# Patient Record
Sex: MALE | Race: WHITE | HISPANIC OR LATINO | Employment: OTHER | URBAN - METROPOLITAN AREA
[De-identification: names, ages, dates, MRNs, and addresses within clinical notes are randomized per-mention and may not be internally consistent; named-entity substitution may affect disease eponyms.]

---

## 2017-04-27 ENCOUNTER — OFFICE VISIT (OUTPATIENT)
Dept: UROLOGY | Facility: CLINIC | Age: 72
End: 2017-04-27
Payer: MEDICARE

## 2017-04-27 VITALS
HEART RATE: 69 BPM | HEIGHT: 67 IN | SYSTOLIC BLOOD PRESSURE: 149 MMHG | BODY MASS INDEX: 33.78 KG/M2 | WEIGHT: 215.19 LBS | DIASTOLIC BLOOD PRESSURE: 86 MMHG

## 2017-04-27 DIAGNOSIS — E29.1 HYPOGONADISM MALE: Primary | ICD-10-CM

## 2017-04-27 PROCEDURE — 99999 PR PBB SHADOW E&M-EST. PATIENT-LVL III: CPT | Mod: PBBFAC,,, | Performed by: UROLOGY

## 2017-04-27 PROCEDURE — 99213 OFFICE O/P EST LOW 20 MIN: CPT | Mod: PBBFAC | Performed by: UROLOGY

## 2017-04-27 PROCEDURE — 99204 OFFICE O/P NEW MOD 45 MIN: CPT | Mod: S$PBB,,, | Performed by: UROLOGY

## 2017-04-27 RX ORDER — CLARITHROMYCIN 500 MG/1
TABLET, FILM COATED ORAL
Refills: 0 | COMMUNITY
Start: 2017-03-09

## 2017-04-27 NOTE — PROGRESS NOTES
Subjective:       Patient ID: Elliott Tucker is a 71 y.o. male.    Chief Complaint: Advice Only (c/ prostate issue , nocturia 2 to 3 times, he voided more during the day time.)    HPI  very nice gentleman who is here for second opinion.  He was seen Dr. Berry has 100 g prostate and symptoms of outlet obstruction.  He takes 5 alpha reductase inhibitor and an alpha-blocker and is satisfied with his voiding.  He feels like he empties his bladder.  Dr. Berry had him scheduled for urodynamics however it was never done and he seems to be doing well so he wishes to be followed.  His other main issue is erectile dysfunction and low testosterone.  I saw one testosterone level was in the mid 500 range and another one that was around 90.  Patient finally explained to me that he was taking IM testosterone from Dr. Oakes.  He may be interested in Testopel.  But I would like to get another testosterone level on him.  His wife is younger he is still interested in having sexual activity    Past Medical History:   Diagnosis Date    Hyperlipidemia     Hypertension        History reviewed. No pertinent surgical history.    Family History   Problem Relation Age of Onset    Hyperlipidemia Other        Social History     Social History    Marital status:      Spouse name: N/A    Number of children: N/A    Years of education: N/A     Occupational History    Not on file.     Social History Main Topics    Smoking status: Never Smoker    Smokeless tobacco: Not on file    Alcohol use 0.0 oz/week     0 Standard drinks or equivalent per week      Comment: whiskey/ 2x weekly    Drug use: Not on file    Sexual activity: Not on file     Other Topics Concern    Not on file     Social History Narrative       Allergies:  Review of patient's allergies indicates no known allergies.    Medications:    Current Outpatient Prescriptions:     dutasteride (AVODART) 0.5 mg capsule, Take 0.5 mg by mouth once daily., Disp: , Rfl:      FOLIC ACID/MULTIVIT-MIN/LUTEIN (CENTRUM SILVER ORAL), Take by mouth., Disp: , Rfl:     mometasone (NASONEX) 50 mcg/actuation nasal spray, 2 sprays by Nasal route once daily., Disp: 17 g, Rfl: 6    nystatin-triamcinolone (MYCOLOG) ointment, Apply topically 2 (two) times daily., Disp: 15 g, Rfl: 1    testosterone cypionate (DEPOTESTOTERONE CYPIONATE) 200 mg/mL injection, Inject 200 mg into the muscle every 14 (fourteen) days., Disp: , Rfl:     valsartan (DIOVAN) 320 MG tablet, Take 320 mg by mouth once daily., Disp: , Rfl:     valsartan-hydrochlorothiazide (DIOVAN-HCT) 320-25 mg per tablet, TK 1 T PO D, Disp: , Rfl: 1    clarithromycin (BIAXIN) 500 MG tablet, TK 1 T PO BID FOR 1 WEEK, Disp: , Rfl: 0    metoprolol succinate (TOPROL-XL) 100 MG 24 hr tablet, Take 100 mg by mouth once daily.  , Disp: , Rfl:     silodosin 8 mg Cap capsule, Take 8 mg by mouth once daily., Disp: , Rfl:     Review of Systems   Constitutional: Negative for activity change, appetite change, chills, diaphoresis, fatigue, fever and unexpected weight change.   HENT: Negative for congestion, dental problem, hearing loss, mouth sores, postnasal drip, rhinorrhea, sinus pressure and trouble swallowing.    Eyes: Negative for pain, discharge and itching.   Respiratory: Negative for apnea, cough, choking, chest tightness, shortness of breath and wheezing.    Cardiovascular: Negative for chest pain, palpitations and leg swelling.   Gastrointestinal: Negative for abdominal distention, abdominal pain, anal bleeding, blood in stool, constipation, diarrhea, nausea, rectal pain and vomiting.   Endocrine: Negative for polydipsia and polyuria.   Genitourinary: Positive for decreased urine volume. Negative for difficulty urinating, discharge, dysuria, enuresis, flank pain, frequency, genital sores, hematuria, penile pain, penile swelling, scrotal swelling, testicular pain and urgency.   Musculoskeletal: Negative for arthralgias, back pain and myalgias.    Skin: Negative for color change, rash and wound.   Neurological: Negative for dizziness, syncope, speech difficulty, light-headedness and headaches.   Hematological: Negative for adenopathy. Does not bruise/bleed easily.   Psychiatric/Behavioral: Negative for behavioral problems, confusion, hallucinations and sleep disturbance.       Objective:      Physical Exam   Constitutional: He appears well-developed.   HENT:   Head: Normocephalic.   Cardiovascular: Normal rate.    Pulmonary/Chest: Effort normal.   Abdominal: Soft.   Neurological: He is alert.   Skin: Skin is warm.     Psychiatric: He has a normal mood and affect.       Assessment:       1. Hypogonadism male        Plan:       Elliott was seen today for advice only.    Diagnoses and all orders for this visit:    Hypogonadism male  -     Testosterone; Future  -     Ambulatory consult to Urology     patient is BPH with 100 g prostate.  He does not need to have surgery at this time since he feels like he is doing well but I did discuss possibility of him taking testosterone and needing future surgery.  If he needs a sugar I could offer him a TURP versus retropubic prostatectomy but he is concerned that this may affect his sexual ability.  At the present time he is voiding well so I'll simply follow him for this.  The graft all arrange an appointment with Dr. Jimmy Sarmiento for possible Testopel depending on the repeat PSA.  He's been off testosterone for about a year.  I will see him back in 6 months and follow his BPH symptoms

## 2017-04-27 NOTE — LETTER
April 27, 2017      KELVIN Hutchins MD  [address]  [facility] [address]           [facility] - Urology 4th Floor  [address]  [facility] [address]  Phone: [phone]          Patient: Elliott Tucker   MR Number: [id number]   YOB: 1945   Date of Visit: 4/27/2017       Dear Dr. KELVIN Hutchins:    Thank you for referring Elliott Tucker to me for evaluation. Attached you will find relevant portions of my assessment and plan of care.    If you have questions, please do not hesitate to call me. I look forward to following Elliott Tucker along with you.    Sincerely,    Andrew Ceron Jr., MD    Enclosure  CC:  No Recipients    If you would like to receive this communication electronically, please contact [email or web address] or [phone] to request more information on [employer] Link access.    For providers and/or their staff who would like to refer a patient to [facility], please contact us through our one-stop-shop provider referral line, [facility] , at [phone].    If you feel you have received this communication in error or would no longer like to receive these types of communications, please e-mail [email or web address]

## 2017-04-27 NOTE — MR AVS SNAPSHOT
[facility] - Urology 4th Floor  [address] [facility] [place] [address]  Phone: [phone]                  Elliott Tucker   2017 10:15 AM   Office Visit    Descripción:  Male : 1945   Personal Médico:  Andrew Ceron Jr., MD   Departamento:  [facility] - Urology 4th Floor           Razón de la zaria     Advice Only           Diagnósticos de Esta Visita        Comentarios    Hypogonadism male    -  Primario            Lista de tareas           Citas próximas        Personal Médico Departamento Tfno del dpto    2017 8:20 AM LAB, APPOINTMENT [place] [facility] [phone]      Metas (5 Years of Data)     Ninguna      [facility] en [facility]     [facility] En [facility] Línea de Enfermeras - Asistencia   Enfermeras registradas de [facility] pueden ayudarle a reservar zoran zaria, proveer educación para la jose, asesoría clínica, y otros servicios de asesoramiento.   Llame para haleigh servicio gratuito a [phone].             Medicamentos           Mensaje sobre Medicamentos     Verificar los cambios y / o adiciones a mei régimen de medicación son los mismos que discutir con mei médico. Si cualquiera de estos cambios o adiciones son incorrectos, por favor notifique a mei proveedor de atención médica.        DEJAR de stacia estos medicamentos     hydrocodone-acetaminophen 5-325mg (NORCO) 5-325 mg per tablet Take 1-2 tablets by mouth every 6 (six) hours as needed for Pain.    lorazepam (ATIVAN) 2 MG Tab Take 0.5 mg by mouth every 6 (six) hours as needed.           Verifique que la siguiente lista de medicamentos es zoran representación exacta de los medicamentos que está tomando actualmente. Si no hay ningunos reportados, la lista puede estar en pearson. Si no es correcta, por favor póngase en contacto con mei proveedor de atención médica. Lleve esta lista con usted en winston de emergencia.           Medicamentos Actuales     dutasteride (AVODART) 0.5 mg capsule Take 0.5 mg by mouth once  "daily.    FOLIC ACID/MULTIVIT-MIN/LUTEIN (CENTRUM SILVER ORAL) Take by mouth.    mometasone (NASONEX) 50 mcg/actuation nasal spray 2 sprays by Nasal route once daily.    nystatin-triamcinolone (MYCOLOG) ointment Apply topically 2 (two) times daily.    testosterone cypionate (DEPOTESTOTERONE CYPIONATE) 200 mg/mL injection Inject 200 mg into the muscle every 14 (fourteen) days.    valsartan (DIOVAN) 320 MG tablet Take 320 mg by mouth once daily.    valsartan-hydrochlorothiazide (DIOVAN-HCT) 320-25 mg per tablet TK 1 T PO D    clarithromycin (BIAXIN) 500 MG tablet TK 1 T PO BID FOR 1 WEEK    metoprolol succinate (TOPROL-XL) 100 MG 24 hr tablet Take 100 mg by mouth once daily.      silodosin 8 mg Cap capsule Take 8 mg by mouth once daily.           Información de referencia clínica           Judith signos vitales jennifer     PS [place] BMI ([facility])       149/86 69 5' 7" (1.702 m) 97.6 kg (215 lb 2.7 oz) 33.7 kg/m2       Blood Pressure          Most Recent Value    BP  (!)  149/86      Alergias     A partir del:  4/27/2017        No Known Allergies      Vacunas     Administradas en la fecha de la visita:  4/27/2017        None      Orders Placed During Today's Visit      Órdenes normales de esta visita    Ambulatory consult to Urology     Exámenes/Procedimientos futuros Se espera el Vence    Testosterone  4/27/2017 6/26/2018      Language Assistance Services     ATTENTION: Language assistance services are available, free of charge. Please call [phone].      ATENCIÓN: Si habla español, tiene a mei disposición servicios gratuitos de asistencia lingüística. Llame al [phone].     [facility] Ý: N?u b?n nói Ti?ng Vi?t, có các d?ch v? h? tr? ngôn ng? mi?n phí dành cho b?n. G?i s? [phone].         [facility] - Urology 4th Floor cumple con las leyes federales aplicables de derechos civiles y no discrimina por motivos de laura, color, origen nacional, edad, discapacidad, o sexo.                 Elliott Tucker "   2017 10:15 AM   Office Visit    Description:  Male : 1945   Provider:  Andrew Ceron Jr., MD   Department:  [facility] 4th Floor           Reason for Visit     Advice Only           Diagnoses this Visit        Comments    Hypogonadism male    -  Primary            To Do List           Future Appointments        Provider Department Dept Phone    2017 8:20 AM LAB, APPOINTMENT [place] [facility] [phone]      Goals     None      [facility] On Call     [facility] Nurse Care Line -  Assistance  Unless otherwise directed by your provider, please contact [facility], our nurse care line that is available for  assistance.     Registered nurses in the [facility] Center provide: appointment scheduling, clinical advisement, health education, and other advisory services.  Call: [phone] (toll free)               Medications           Message regarding Medications     Verify the changes and/or additions to your medication regime listed below are the same as discussed with your clinician today.  If any of these changes or additions are incorrect, please notify your healthcare provider.        STOP taking these medications     hydrocodone-acetaminophen 5-325mg (NORCO) 5-325 mg per tablet Take 1-2 tablets by mouth every 6 (six) hours as needed for Pain.    lorazepam (ATIVAN) 2 MG Tab Take 0.5 mg by mouth every 6 (six) hours as needed.           Verify that the below list of medications is an accurate representation of the medications you are currently taking.  If none reported, the list may be blank. If incorrect, please contact your healthcare provider. Carry this list with you in case of emergency.           Current Medications     dutasteride (AVODART) 0.5 mg capsule Take 0.5 mg by mouth once daily.    FOLIC ACID/MULTIVIT-MIN/LUTEIN (CENTRUM SILVER ORAL) Take by mouth.    mometasone (NASONEX) 50 mcg/actuation nasal spray 2 sprays by Nasal route  "once daily.    nystatin-triamcinolone (MYCOLOG) ointment Apply topically 2 (two) times daily.    testosterone cypionate (DEPOTESTOTERONE CYPIONATE) 200 mg/mL injection Inject 200 mg into the muscle every 14 (fourteen) days.    valsartan (DIOVAN) 320 MG tablet Take 320 mg by mouth once daily.    valsartan-hydrochlorothiazide (DIOVAN-HCT) 320-25 mg per tablet TK 1 T PO D    clarithromycin (BIAXIN) 500 MG tablet TK 1 T PO BID FOR 1 WEEK    metoprolol succinate (TOPROL-XL) 100 MG 24 hr tablet Take 100 mg by mouth once daily.      silodosin 8 mg Cap capsule Take 8 mg by mouth once daily.           Clinical Reference Information           Your Vitals Were     BP Pulse Height Weight BMI       149/86 69 5' 7" (1.702 m) 97.6 kg (215 lb 2.7 oz) 33.7 kg/m2       Blood Pressure          Most Recent Value    BP  (!)  149/86      Allergies as of 4/27/2017     No Known Allergies      Immunizations Administered on Date of Encounter - 4/27/2017     None      Orders Placed During Today's Visit      Normal Orders This Visit    Ambulatory consult to Urology     Future Labs/Procedures Expected by Expires    Testosterone  4/27/2017 6/26/2018      Language Assistance Services     ATTENTION: Language assistance services are available, free of charge. Please call [phone].      ATENCIÓN: Si marah hawkins, tiene a mei disposición servicios gratuitos de asistencia lingüística. Llame al [phone].     TONI Ý: N?u b?n nói Ti?ng Vi?t, có các d?ch v? h? tr? ngôn ng? mi?n phí dành cho b?n. G?i s? [phone].         [facility] - Urology 4th Floor complies with applicable Federal civil rights laws and does not discriminate on the basis of race, color, national origin, age, disability, or sex.          "

## 2017-04-28 ENCOUNTER — LAB VISIT (OUTPATIENT)
Dept: LAB | Facility: HOSPITAL | Age: 72
End: 2017-04-28
Attending: UROLOGY
Payer: MEDICARE

## 2017-04-28 DIAGNOSIS — E29.1 HYPOGONADISM MALE: ICD-10-CM

## 2017-04-28 DIAGNOSIS — E29.1 HYPOGONADISM MALE: Primary | ICD-10-CM

## 2017-04-28 LAB — TESTOST SERPL-MCNC: 265 NG/DL

## 2017-04-28 PROCEDURE — 36415 COLL VENOUS BLD VENIPUNCTURE: CPT

## 2017-04-28 PROCEDURE — 84403 ASSAY OF TOTAL TESTOSTERONE: CPT

## 2017-04-29 ENCOUNTER — PATIENT MESSAGE (OUTPATIENT)
Dept: UROLOGY | Facility: CLINIC | Age: 72
End: 2017-04-29

## 2017-05-05 RX ORDER — DUTASTERIDE 0.5 MG/1
0.5 CAPSULE, LIQUID FILLED ORAL DAILY
Qty: 90 CAPSULE | Refills: 3 | Status: SHIPPED | OUTPATIENT
Start: 2017-05-05

## 2017-06-14 ENCOUNTER — OFFICE VISIT (OUTPATIENT)
Dept: UROLOGY | Facility: CLINIC | Age: 72
End: 2017-06-14
Payer: MEDICARE

## 2017-06-14 VITALS
WEIGHT: 226.63 LBS | HEIGHT: 67 IN | SYSTOLIC BLOOD PRESSURE: 132 MMHG | HEART RATE: 82 BPM | DIASTOLIC BLOOD PRESSURE: 80 MMHG | BODY MASS INDEX: 35.57 KG/M2

## 2017-06-14 DIAGNOSIS — N13.8 BPH WITH URINARY OBSTRUCTION: ICD-10-CM

## 2017-06-14 DIAGNOSIS — I10 ESSENTIAL HYPERTENSION: ICD-10-CM

## 2017-06-14 DIAGNOSIS — N52.01 ERECTILE DYSFUNCTION DUE TO ARTERIAL INSUFFICIENCY: Primary | ICD-10-CM

## 2017-06-14 DIAGNOSIS — E78.5 HYPERLIPIDEMIA, UNSPECIFIED HYPERLIPIDEMIA TYPE: ICD-10-CM

## 2017-06-14 DIAGNOSIS — N40.1 BPH WITH URINARY OBSTRUCTION: ICD-10-CM

## 2017-06-14 PROCEDURE — 1159F MED LIST DOCD IN RCRD: CPT | Mod: ,,, | Performed by: UROLOGY

## 2017-06-14 PROCEDURE — 99999 PR PBB SHADOW E&M-EST. PATIENT-LVL III: CPT | Mod: PBBFAC,,, | Performed by: UROLOGY

## 2017-06-14 PROCEDURE — 99213 OFFICE O/P EST LOW 20 MIN: CPT | Mod: PBBFAC | Performed by: UROLOGY

## 2017-06-14 PROCEDURE — 99214 OFFICE O/P EST MOD 30 MIN: CPT | Mod: S$PBB,,, | Performed by: UROLOGY

## 2017-06-14 RX ORDER — TADALAFIL 20 MG/1
20 TABLET ORAL DAILY PRN
Qty: 10 TABLET | Refills: 11 | Status: SHIPPED | OUTPATIENT
Start: 2017-06-14 | End: 2023-08-27 | Stop reason: SDUPTHER

## 2017-06-14 RX ORDER — TADALAFIL 20 MG/1
20 TABLET ORAL DAILY PRN
Qty: 10 TABLET | Refills: 11 | Status: SHIPPED | OUTPATIENT
Start: 2017-06-14 | End: 2017-06-14

## 2017-06-14 NOTE — PROGRESS NOTES
CHIEF COMPLAINT:    Mr. Tucker is a 71 y.o. male presenting for a consultation at the request of Dr. Ceron. Patient presents with low T.    PRESENTING ILLNESS:    Elliott Tucker is a 71 y.o. male who c/o ED.  He's tried and failed Viagra due to congestion.  He gets good results with Cialis.  This has been present for > 1 year.    He reports a history of low T.  He's been on TRT via injections in the past.  However, he also reports a history of an elevated prolactin.  He's had a T here x 1 that was low.    He's on Avodart and rapaflo for his LUTS managed by Dr. Ceron.  REVIEW OF SYSTEMS:    Elliott Tucker denies any history of headache, blurred vision, fever, nausea, vomiting, chills, abdominal pain, bleeding per rectum, cough, SOB, recent loss of consciousness, recent mental status changes, seizures, dizziness, or upper or lower extremity weakness.    CHAVEZ  1. 1  2. 2  3. 3  4. 2  5. 1      PATIENT HISTORY:    Past Medical History:   Diagnosis Date    Hyperlipidemia     Hypertension        History reviewed. No pertinent surgical history.    Family History   Problem Relation Age of Onset    Hyperlipidemia Other        Social History     Social History    Marital status:      Spouse name: N/A    Number of children: N/A    Years of education: N/A     Occupational History    Not on file.     Social History Main Topics    Smoking status: Never Smoker    Smokeless tobacco: Not on file    Alcohol use 0.0 oz/week      Comment: whiskey/ 2x weekly    Drug use: Unknown    Sexual activity: Not on file     Other Topics Concern    Not on file     Social History Narrative    No narrative on file       Allergies:  Review of patient's allergies indicates no known allergies.    Medications:    Current Outpatient Prescriptions:     dutasteride (AVODART) 0.5 mg capsule, Take 1 capsule (0.5 mg total) by mouth once daily., Disp: 90 capsule, Rfl: 3    FOLIC ACID/MULTIVIT-MIN/LUTEIN (CENTRUM SILVER ORAL), Take by  mouth., Disp: , Rfl:     metoprolol succinate (TOPROL-XL) 100 MG 24 hr tablet, Take 100 mg by mouth once daily.  , Disp: , Rfl:     mometasone (NASONEX) 50 mcg/actuation nasal spray, 2 sprays by Nasal route once daily., Disp: 17 g, Rfl: 6    nystatin-triamcinolone (MYCOLOG) ointment, Apply topically 2 (two) times daily., Disp: 15 g, Rfl: 1    valsartan-hydrochlorothiazide (DIOVAN-HCT) 320-25 mg per tablet, TK 1 T PO D, Disp: , Rfl: 1    clarithromycin (BIAXIN) 500 MG tablet, TK 1 T PO BID FOR 1 WEEK, Disp: , Rfl: 0    silodosin 8 mg Cap capsule, Take 8 mg by mouth once daily., Disp: , Rfl:     testosterone cypionate (DEPOTESTOTERONE CYPIONATE) 200 mg/mL injection, Inject 200 mg into the muscle every 14 (fourteen) days., Disp: , Rfl:     valsartan (DIOVAN) 320 MG tablet, Take 320 mg by mouth once daily., Disp: , Rfl:     PHYSICAL EXAMINATION:    The patient generally appears in good health, is appropriately interactive, and is in no apparent distress.     Eyes: anicteric sclerae, moist conjunctivae; no lid-lag; PERRLA     HENT: Atraumatic; oropharynx clear with moist mucous membranes and no mucosal ulcerations;normal hard and soft palate.  No evidence of lymphadenopathy.    Neck: Trachea midline.  No thyromegaly.    Musculoskeletal: No abnormal gait.    Skin: No lesions.    Mental: Cooperative with normal affect.  Is oriented to time, place, and person.    Neuro: Grossly intact.    Chest: Normal inspiratory effort.   No accessory muscles.  No audible wheezes.  Respirations symmetric on inspiration and expiration.    Heart: Regular rhythm.      Abdomen:  Soft, non-tender. No masses or organomegaly. Bladder is not palpable. No evidence of flank discomfort. No evidence of inguinal hernia.    Genitourinary: The penis is not circumcised with no evidence of plaques or induration. The urethral meatus is normal. The testes, epididymides, and cord structures are normal in size and contour bilaterally. The scrotum is  normal in size and contour.    Normal anal sphincter tone. No rectal mass.    The prostate is smooth. Normal landmarks. Lateral sulci. Median furrow intact.  No nodularity or induration. Seminal vesicles are normal.    Extremities: No clubbing, cyanosis, or edema      LABS:      No results found for: PSA, PSADIAG, PSATOTAL, PSAFREE, PSAFREEPCT    IMPRESSION:    Encounter Diagnoses   Name Primary?    Erectile dysfunction due to arterial insufficiency Yes    BPH with urinary obstruction     Essential hypertension     Hyperlipidemia, unspecified hyperlipidemia type    HTN,controlled  Hyperlipidemia, controlled    PLAN:    1. Will repeat a T with a prolactin in the AM.  2. Will try Cialis for the ED. Side effects discussed.  A new Rx was given  3. Continue Avodart and rapaflo for his LUTS.  Can continue to see Dr. Ceron for these.    Copy to:

## 2017-06-14 NOTE — PATIENT INSTRUCTIONS
Oral Medications for Erectile Dysfunction  Prescription oral medications can be used for ED. They often work well. But, like all medications, they can have side effects. Also, they cant be used if a man has certain health problems or takes certain other medications. Talk with your doctor about oral ED medication. Ask whether it is right for you.  Types of Oral ED Medications  There are three types of prescription oral ED medications. Each one increases blood flow to the penis. When the penis is stimulated, an erection results. The three types are:  · Sildenafil citrate (Viagra)  · Tadalafil (Cialis)  · Vardenafil HCl (Levitra)  What Oral ED Medications Dont Do  There are some things ED medications cant do.  · They dont cure the cause of your ED. Without the medication, youll still have trouble getting an erection.  · They cant produce an erection without sexual stimulation.  · They wont increase sexual desire. They also wont solve any other sexual issues. Psychological, emotional, or relationship issues will not be fixed.  Taking Oral ED Medications Safely  · Do not combine ED medications with other treatments unless your doctor tells you to.  · Dont take ED medications more often or in larger doses than prescribed.  · Tell your doctor your health history. Mention all medications you take. This includes over-the-counter drugs, supplements, and herbs.  · Ask your doctor about whether you can drink alcohol while taking ED medication.  Possible Side Effects of Oral ED Medications  · Headache  · Facial flushing  · Runny or stuffy nose  · Indigestion  · Distortion of your color vision for a short time  · Sudden vision loss or hearing loss (rare)  Risks of Oral ED Medications   · Do not take ED medications if you take medications containing nitrates. The combination may drop your blood pressure to a dangerous level. Nitrates include nitroglycerin (a drug for angina). They are also in poppers, an inhaled  recreational drug. If youre not sure whether youre taking nitrates, ask your doctor or pharmacist.  · Medications called alpha-blockers can interact with ED medications. They can cause a sudden drop in blood pressure. Alpha-blockers are a common treatment for prostate problems. They also treat high blood pressure. Be sure your doctor knows if you take these medications.  · If youve had a heart attack or have heart disease and you have not had sex for a while, talk to your doctor. Having sex again can put extra strain on your heart. Your doctor can confirm that your heart is healthy enough for sex.  · It is rare, but some men taking ED medications have had sudden vision loss. This may be more likely if other health problems are present. These include high blood pressure and diabetes. Ask your doctor if you are at risk for this type of vision loss.  · In rare cases, an erection may last too long. This can badly damage the blood vessels in your penis. If an erection lasts longer than 4 hours, go to the emergency room right away.       © 1679-2156 [employer], [address] [place] [address], [place] [address]. All rights reserved. This information is not intended as a substitute for professional medical care. Always follow your healthcare professional's instructions.

## 2017-06-14 NOTE — LETTER
June 14, 2017      Andrew Ceron Jr., MD  [address] [facility] [address]           [facility] - Urology 4th Floor  [address]  [facility] [address]  Phone: [phone]          Patient: Elliott Tucker   MR Number: [id number]   YOB: 1945   Date of Visit: 6/14/2017       Dear Dr. Andrew Ceron Jr.:    Thank you for referring Elliott Tucker to me for evaluation. Attached you will find relevant portions of my assessment and plan of care.    If you have questions, please do not hesitate to call me. I look forward to following Elliott Tucker along with you.    Sincerely,    Jimmy Sarmiento MD    Enclosure  CC:  No Recipients    If you would like to receive this communication electronically, please contact [email or web address] or [phone] to request more information on [employer] Link access.    For providers and/or their staff who would like to refer a patient to [facility], please contact us through our one-stop-shop provider referral line, [facility], at [phone].    If you feel you have received this communication in error or would no longer like to receive these types of communications, please e-mail [email or web address]

## 2017-06-15 ENCOUNTER — LAB VISIT (OUTPATIENT)
Dept: LAB | Facility: HOSPITAL | Age: 72
End: 2017-06-15
Attending: UROLOGY
Payer: MEDICARE

## 2017-06-15 DIAGNOSIS — N52.01 ERECTILE DYSFUNCTION DUE TO ARTERIAL INSUFFICIENCY: ICD-10-CM

## 2017-06-15 LAB
PROLACTIN SERPL IA-MCNC: 20.5 NG/ML
TESTOST SERPL-MCNC: 334 NG/DL

## 2017-06-15 PROCEDURE — 36415 COLL VENOUS BLD VENIPUNCTURE: CPT

## 2017-06-15 PROCEDURE — 84146 ASSAY OF PROLACTIN: CPT

## 2017-06-15 PROCEDURE — 84403 ASSAY OF TOTAL TESTOSTERONE: CPT

## 2017-06-19 ENCOUNTER — TELEPHONE (OUTPATIENT)
Dept: UROLOGY | Facility: CLINIC | Age: 72
End: 2017-06-19

## 2017-06-19 NOTE — TELEPHONE ENCOUNTER
Please notify that his T is normal, but his prolactin is high.  He should see endocrine regarding this.    He can continue to see Dr. Ceron.  RTC to see me prn.

## 2018-04-19 ENCOUNTER — HOSPITAL ENCOUNTER (OUTPATIENT)
Dept: RADIOLOGY | Facility: HOSPITAL | Age: 73
Discharge: HOME | End: 2018-04-19
Attending: ORTHOPAEDIC SURGERY
Payer: MEDICARE

## 2018-04-19 DIAGNOSIS — M25.561 RIGHT KNEE PAIN: Primary | ICD-10-CM

## 2018-04-19 DIAGNOSIS — M25.561 RIGHT KNEE PAIN: ICD-10-CM

## 2018-04-19 PROCEDURE — 73564 X-RAY EXAM KNEE 4 OR MORE: CPT | Mod: 26,RT,, | Performed by: RADIOLOGY

## 2018-04-19 PROCEDURE — 73564 X-RAY EXAM KNEE 4 OR MORE: CPT | Mod: TC,FY,RT

## 2021-11-01 ENCOUNTER — IMMUNIZATION (OUTPATIENT)
Dept: PRIMARY CARE CLINIC | Facility: CLINIC | Age: 76
End: 2021-11-01
Payer: MEDICARE

## 2021-11-01 DIAGNOSIS — Z23 NEED FOR VACCINATION: Primary | ICD-10-CM

## 2021-11-01 PROCEDURE — 91300 COVID-19, MRNA, LNP-S, PF, 30 MCG/0.3 ML DOSE VACCINE: CPT | Mod: PBBFAC | Performed by: INTERNAL MEDICINE

## 2022-06-06 ENCOUNTER — OFFICE VISIT (OUTPATIENT)
Dept: URGENT CARE | Facility: CLINIC | Age: 77
End: 2022-06-06
Payer: MEDICARE

## 2022-06-06 VITALS
WEIGHT: 226 LBS | HEART RATE: 78 BPM | HEIGHT: 67 IN | RESPIRATION RATE: 20 BRPM | BODY MASS INDEX: 35.47 KG/M2 | DIASTOLIC BLOOD PRESSURE: 88 MMHG | TEMPERATURE: 97 F | SYSTOLIC BLOOD PRESSURE: 140 MMHG | OXYGEN SATURATION: 97 %

## 2022-06-06 DIAGNOSIS — R05.9 COUGH: Primary | ICD-10-CM

## 2022-06-06 LAB
CTP QC/QA: YES
SARS-COV-2 RDRP RESP QL NAA+PROBE: NEGATIVE

## 2022-06-06 PROCEDURE — 99214 PR OFFICE/OUTPT VISIT, EST, LEVL IV, 30-39 MIN: ICD-10-PCS | Mod: S$GLB,,, | Performed by: FAMILY MEDICINE

## 2022-06-06 PROCEDURE — U0002: ICD-10-PCS | Mod: QW,CR,S$GLB, | Performed by: FAMILY MEDICINE

## 2022-06-06 PROCEDURE — 99214 OFFICE O/P EST MOD 30 MIN: CPT | Mod: S$GLB,,, | Performed by: FAMILY MEDICINE

## 2022-06-06 PROCEDURE — U0002 COVID-19 LAB TEST NON-CDC: HCPCS | Mod: QW,CR,S$GLB, | Performed by: FAMILY MEDICINE

## 2022-06-06 RX ORDER — BENZONATATE 100 MG/1
100 CAPSULE ORAL EVERY 6 HOURS PRN
Qty: 30 CAPSULE | Refills: 1 | Status: SHIPPED | OUTPATIENT
Start: 2022-06-06 | End: 2023-06-06

## 2022-06-06 RX ORDER — PROMETHAZINE HYDROCHLORIDE AND DEXTROMETHORPHAN HYDROBROMIDE 6.25; 15 MG/5ML; MG/5ML
5 SYRUP ORAL NIGHTLY PRN
Qty: 118 ML | Refills: 0 | Status: SHIPPED | OUTPATIENT
Start: 2022-06-06 | End: 2022-06-16

## 2022-06-06 NOTE — PROGRESS NOTES
Subjective:       Patient ID: Elliott Tucker is a 76 y.o. male.    Chief Complaint: No chief complaint on file.    HPI  ROS     Objective:      Physical Exam    Assessment:       No diagnosis found.    Plan:                   No follow-ups on file.

## 2022-06-06 NOTE — PROGRESS NOTES
"Subjective:       Patient ID: Elliott Tucker is a 76 y.o. male.    Vitals:  height is 5' 7" (1.702 m) and weight is 102.5 kg (226 lb). His temperature is 97 °F (36.1 °C). His blood pressure is 140/88 (abnormal) and his pulse is 78. His respiration is 20 and oxygen saturation is 97%.     Chief Complaint: Cough    Pt states for 15 days having a dry cough, he denies any other sx ,he is vaccinated. Reports positive home COVID-19 test 2 weeks ago but has had persistent cough. Other symptoms have since resolved.    Cough  This is a new problem. The current episode started 1 to 4 weeks ago. The problem has been gradually worsening. The problem occurs constantly. The cough is non-productive.       Respiratory: Positive for cough.        Objective:      Physical Exam   Constitutional: He does not appear ill. No distress. normal  HENT:   Head: Normocephalic and atraumatic.   Nose: Congestion present.   Eyes: Extraocular movement intact   Cardiovascular: Normal rate, regular rhythm, normal heart sounds and normal pulses.   Pulmonary/Chest: Effort normal and breath sounds normal. No respiratory distress. He has no wheezes. He has no rhonchi. He has no rales.   Abdominal: Normal appearance.   Neurological: He is alert.   Nursing note and vitals reviewed.    Results for orders placed or performed in visit on 06/06/22   POCT COVID-19 Rapid Screening   Result Value Ref Range    POC Rapid COVID Negative Negative     Acceptable Yes          Assessment:       1. Cough        No evidence of respiratory distress or compromise on physical exam. Clinically well appearing. Will treat symptomatically. RTC prn worsening symptoms.   Plan:         Cough  -     POCT COVID-19 Rapid Screening  -     benzonatate (TESSALON PERLES) 100 MG capsule; Take 1 capsule (100 mg total) by mouth every 6 (six) hours as needed for Cough.  Dispense: 30 capsule; Refill: 1  -     promethazine-dextromethorphan (PROMETHAZINE-DM) 6.25-15 mg/5 mL Syrp; " Take 5 mLs by mouth nightly as needed.  Dispense: 118 mL; Refill: 0

## 2022-07-15 ENCOUNTER — IMMUNIZATION (OUTPATIENT)
Dept: PRIMARY CARE CLINIC | Facility: CLINIC | Age: 77
End: 2022-07-15
Payer: MEDICARE

## 2022-07-15 DIAGNOSIS — Z23 NEED FOR VACCINATION: Primary | ICD-10-CM

## 2022-07-15 PROCEDURE — 91305 COVID-19, MRNA, LNP-S, PF, 30 MCG/0.3 ML DOSE VACCINE (PFIZER): CPT | Mod: PBBFAC | Performed by: INTERNAL MEDICINE

## 2022-11-02 ENCOUNTER — IMMUNIZATION (OUTPATIENT)
Dept: PRIMARY CARE CLINIC | Facility: CLINIC | Age: 77
End: 2022-11-02
Payer: MEDICARE

## 2022-11-02 DIAGNOSIS — Z23 NEED FOR VACCINATION: Primary | ICD-10-CM

## 2022-11-02 PROCEDURE — 0124A COVID-19, MRNA, LNP-S, BIVALENT BOOSTER, PF, 30 MCG/0.3 ML DOSE: CPT | Mod: CV19,PBBFAC | Performed by: INTERNAL MEDICINE

## 2022-11-02 PROCEDURE — 91312 COVID-19, MRNA, LNP-S, BIVALENT BOOSTER, PF, 30 MCG/0.3 ML DOSE: CPT | Mod: PBBFAC | Performed by: INTERNAL MEDICINE

## 2023-08-23 ENCOUNTER — TELEPHONE (OUTPATIENT)
Dept: UROLOGY | Facility: CLINIC | Age: 78
End: 2023-08-23
Payer: MEDICARE

## 2023-08-23 NOTE — NURSING
Oncology Navigation   Intake  Cancer Type:   Internal / External Referral: External  Date of Referral: 08/23/23  Initial Nurse Navigator Contact: 08/23/23  Referral to Initial Contact Timeline (days): 0  Date Worked: 08/23/23  Appointment Date: 08/25/23     Treatment     Surgical Oncologist: Krzysztof Burton  Consult Date: 08/25/23                          Acuity      Follow Up  No follow-ups on file.

## 2023-08-23 NOTE — TELEPHONE ENCOUNTER
: Tamiko [id number]    Oncology nurse navigator contacted the patient with 's assistance to discuss his appointment request. The patient has recent diagnosis of prostate cancer and would like an opinion at [facility]. He was previously seen by Dr. Cruz. Our team will collect records and imaging. The patient is in agreement with an appointment on Friday 8/25- 1pm with Dr. Krzysztof Burton. Date, time, location reviewed. Contact information provided for additional needs.

## 2023-08-25 ENCOUNTER — OFFICE VISIT (OUTPATIENT)
Dept: UROLOGY | Facility: CLINIC | Age: 78
End: 2023-08-25
Payer: MEDICARE

## 2023-08-25 VITALS
HEIGHT: 67 IN | WEIGHT: 192.88 LBS | SYSTOLIC BLOOD PRESSURE: 132 MMHG | DIASTOLIC BLOOD PRESSURE: 56 MMHG | BODY MASS INDEX: 30.27 KG/M2 | HEART RATE: 64 BPM

## 2023-08-25 DIAGNOSIS — N40.0 BPH WITH ELEVATED PSA: Primary | ICD-10-CM

## 2023-08-25 DIAGNOSIS — N52.01 ERECTILE DYSFUNCTION DUE TO ARTERIAL INSUFFICIENCY: ICD-10-CM

## 2023-08-25 DIAGNOSIS — Z12.5 PROSTATE CANCER SCREENING ENCOUNTER, OPTIONS AND RISKS DISCUSSED: ICD-10-CM

## 2023-08-25 DIAGNOSIS — R97.20 BPH WITH ELEVATED PSA: Primary | ICD-10-CM

## 2023-08-25 LAB — POC RESIDUAL URINE VOLUME: 108 ML (ref 0–100)

## 2023-08-25 PROCEDURE — 99999PBSHW POCT BLADDER SCAN: Mod: PBBFAC,,,

## 2023-08-25 PROCEDURE — 99999PBSHW POCT BLADDER SCAN: ICD-10-PCS | Mod: PBBFAC,,,

## 2023-08-25 PROCEDURE — 99205 OFFICE O/P NEW HI 60 MIN: CPT | Mod: S$PBB,,, | Performed by: UROLOGY

## 2023-08-25 PROCEDURE — 99999 PR PBB SHADOW E&M-EST. PATIENT-LVL III: ICD-10-PCS | Mod: PBBFAC,,, | Performed by: UROLOGY

## 2023-08-25 PROCEDURE — 51798 US URINE CAPACITY MEASURE: CPT | Mod: PBBFAC | Performed by: UROLOGY

## 2023-08-25 PROCEDURE — 99213 OFFICE O/P EST LOW 20 MIN: CPT | Mod: PBBFAC | Performed by: UROLOGY

## 2023-08-25 PROCEDURE — 99999 PR PBB SHADOW E&M-EST. PATIENT-LVL III: CPT | Mod: PBBFAC,,, | Performed by: UROLOGY

## 2023-08-25 PROCEDURE — 99205 PR OFFICE/OUTPT VISIT, NEW, LEVL V, 60-74 MIN: ICD-10-PCS | Mod: S$PBB,,, | Performed by: UROLOGY

## 2023-08-25 PROCEDURE — 78730 URINARY BLADDER RETENTION: CPT | Mod: PBBFAC | Performed by: UROLOGY

## 2023-08-27 RX ORDER — TADALAFIL 20 MG/1
20 TABLET ORAL DAILY PRN
Qty: 10 TABLET | Refills: 11 | Status: SHIPPED | OUTPATIENT
Start: 2023-08-27

## 2023-08-27 NOTE — PROGRESS NOTES
[facility]  Urologic Oncology      Date of Service: 08/27/2023    Chief Complaint/Reason for Consultation:   Prostate Cancer    Requesting Provider:   Self, Aaareferral  No address on file      History of Present Illness:   Patient is a 77 y.o. male presenting for evaluation of Prostate Cancer.  He is joined by his wife after his recent diagnosis.  We are accompanied by a  in the room.    He reports that he underwent a prostate biopsy on May 15, 2023.  He carries in the pathology report, which shows a 14 point biopsy, with Pana 3+4=7 found at the region of interest only.  He had no other positive disease elsewhere on the biopsy.  At this reason images, there was no lymphovascular invasion and no perineural invasion.    Imaging: I have reviewed the imaging study MRI of the prostate with and without contrast on 03/10/2023 personally, which showed a PI-RADS 4 lesion in the prostate.  Again, I have reviewed the images personally and agree with the assessment.    Functionally, he has some lower urinary tract symptoms but endorses a good urinary stream but daytime frequency 12 13 times per day.  He is nocturia x1     He does have erectile dysfunction but has not used medications recently.  He noticed over the last 1-2 years that his erectile quality has decreased.  He has tried Cialis in the past with good response.    He reports having some flank pain and would like to know if this is due to backup of urine or kidney issues.    Current Problem List:  Patient Active Problem List    Diagnosis Date Noted    Erectile dysfunction due to arterial insufficiency 06/14/2017    BPH with urinary obstruction 06/14/2017    Pruritus ani 12/06/2016    Prolapsed internal hemorrhoids, grade 3 02/03/2016    Hyperlipidemia     Hypertension         Allergies:  Review of patient's allergies indicates:  No Known Allergies     Medications per EMR:  (Not in a hospital admission)      Past Medical History:  Past  "Medical History:   Diagnosis Date    Hyperlipidemia     Hypertension         Past Surgical History:  History reviewed. No pertinent surgical history.     Family History:  Family History   Problem Relation Age of Onset    Hyperlipidemia Other         Social History:  Social History     Tobacco Use    Smoking status: Never    Smokeless tobacco: Not on file   Substance Use Topics    Alcohol use: Yes     Alcohol/week: 0.0 standard drinks of alcohol     Comment: whiskey/ 2x weekly          OBJECTIVE:     Vitals:    08/25/23 1248   BP: (!) 132/56   Pulse: 64   Weight: 87.5 kg (192 lb 14.4 oz)   Height: 5' 7" (1.702 m)          General Appearance: Alert, cooperative, no distress  Head: Normocephalic  Eyes: Clear conjunctiva  Neck: No obvious LND or JVD  Lungs: Normal chest excursion, no accessory muscle use  Chest: Regular rate rhythm by palpation, no pedal edema  Abdomen: Soft, non-tender, non-distended, no rebound, guarding, rigidity  Extremities: Atraumatic   Lymph Nodes: No appreciable lymph adenopathy  Neurologic: No gross gait, motor or sensory deficits        LAB:    CBC:  No results found for: "WBC", "HGB", "HCT", "MCV", "PLT"      BMP:  Lab Results   Component Value Date     11/26/2012    K 3.6 11/26/2012     11/26/2012    CO2 31 (H) 11/26/2012    BUN 9 11/26/2012    CREATININE 1.2 11/26/2012    CALCIUM 10.1 11/26/2012    ANIONGAP 6 (L) 11/26/2012           IMAGING:      See MRI 3/10/23      ASSESSMENT/PLAN:     Assessment:  77-year-old male with recently diagnosed prostate cancer, 3+4=7 at 1 core of the region of interest on a biopsy performed in May of 2023      Plan:  I had a long thorough discussion with the patient and his wife in the presence of a  regarding the natural history of low-grade low volume prostate cancer.  At his age of 77, we discussed his life expectancy and the cancer specific mortality of this type of prostate cancer.  We discussed options such as active surveillance " versus treatment with curative intent as well as focal therapy.    Following the discussion, the patient made an informed decision to pursue active surveillance, which was also my primary recommendation.  We will see him back 6 months after his initial diagnosis which will be November of 2023.  We will obtain a PSA and perform a DI at that time.      For his lower urinary tract symptoms, we will perform a PVR today in clinic and will obtain a ultrasound of the kidney.  I will call him if there are any abnormalities on this, and plan to see him back in November.      For his erectile dysfunction, I can prescribe him Tadalafil 20 mg to be used on a p.r.n. basis.  We discussed proper dosing of this, not to exceed 1 tab in 48-72 hours, and I confirmed that he does not take nitrates for chest pain.  We discussed the side effect profile, and he has tolerated this in the past.  I will send a his pharmacy on file, all questions and concerns were addressed.      In this visit, I have reviewed from the electronic medical record or external medical records:  Tests, documents, or independent historians:  Prior external notes  Prior results  Ordering of unique test  Assessment requiring independent historians  2.   Independent interpretation of results